# Patient Record
Sex: FEMALE | Race: WHITE | Employment: OTHER | URBAN - METROPOLITAN AREA
[De-identification: names, ages, dates, MRNs, and addresses within clinical notes are randomized per-mention and may not be internally consistent; named-entity substitution may affect disease eponyms.]

---

## 2017-03-29 ENCOUNTER — HOSPITAL ENCOUNTER (OUTPATIENT)
Dept: WOMENS IMAGING | Age: 57
Discharge: HOME | End: 2017-03-29
Attending: OBSTETRICS & GYNECOLOGY | Admitting: OBSTETRICS & GYNECOLOGY

## 2017-03-29 DIAGNOSIS — Z12.31 VISIT FOR SCREENING MAMMOGRAM: ICD-10-CM

## 2018-02-08 ENCOUNTER — HOSPITAL ENCOUNTER (OUTPATIENT)
Dept: MAMMOGRAPHY | Age: 58
Discharge: HOME | End: 2018-02-08
Attending: OBSTETRICS & GYNECOLOGY | Admitting: OBSTETRICS & GYNECOLOGY

## 2018-02-08 DIAGNOSIS — Z12.31 VISIT FOR SCREENING MAMMOGRAM: ICD-10-CM

## 2018-05-02 ENCOUNTER — HOSPITAL ENCOUNTER (OUTPATIENT)
Dept: WOMENS IMAGING | Age: 58
Discharge: HOME | End: 2018-05-02
Attending: OBSTETRICS & GYNECOLOGY | Admitting: OBSTETRICS & GYNECOLOGY

## 2018-05-02 DIAGNOSIS — Z12.31 VISIT FOR SCREENING MAMMOGRAM: ICD-10-CM

## 2018-05-07 ENCOUNTER — CASE MANAGEMENT (OUTPATIENT)
Dept: CASE MANAGEMENT | Age: 58
End: 2018-05-07

## 2018-05-07 ENCOUNTER — HOSPITAL ENCOUNTER (OUTPATIENT)
Dept: WOMENS IMAGING | Age: 58
Discharge: HOME | End: 2018-05-07
Admitting: OBSTETRICS & GYNECOLOGY

## 2018-05-07 DIAGNOSIS — R92.8 ABNORMAL MAMMOGRAM: ICD-10-CM

## 2018-05-07 DIAGNOSIS — R92.8 OTHER ABNORMAL AND INCONCLUSIVE FINDINGS ON DIAGNOSTIC IMAGING OF BREAST: ICD-10-CM

## 2019-02-13 ENCOUNTER — HOSPITAL ENCOUNTER (OUTPATIENT)
Dept: MAMMOGRAPHY | Age: 59
Discharge: HOME | End: 2019-02-18
Payer: COMMERCIAL

## 2019-02-13 DIAGNOSIS — Z12.31 VISIT FOR SCREENING MAMMOGRAM: ICD-10-CM

## 2019-02-13 PROCEDURE — 77067 SCR MAMMO BI INCL CAD: CPT

## 2020-10-01 ENCOUNTER — HOSPITAL ENCOUNTER (OUTPATIENT)
Dept: WOMENS IMAGING | Age: 60
Discharge: HOME | End: 2020-10-01

## 2020-10-01 PROCEDURE — 77067 SCR MAMMO BI INCL CAD: CPT

## 2021-03-08 ENCOUNTER — NURSE ONLY (OUTPATIENT)
Dept: PRIMARY CARE CLINIC | Age: 61
End: 2021-03-08
Payer: COMMERCIAL

## 2021-03-08 DIAGNOSIS — Z23 HIGH PRIORITY FOR COVID-19 VIRUS VACCINATION: Primary | ICD-10-CM

## 2021-03-08 PROCEDURE — 0011A COVID-19, MODERNA VACCINE 100MCG/0.5ML DOSE: CPT | Performed by: FAMILY MEDICINE

## 2021-03-08 PROCEDURE — 91301 COVID-19, MODERNA VACCINE 100MCG/0.5ML DOSE: CPT | Performed by: FAMILY MEDICINE

## 2021-03-12 ENCOUNTER — NURSE ONLY (OUTPATIENT)
Dept: PRIMARY CARE CLINIC | Age: 61
End: 2021-03-12
Payer: COMMERCIAL

## 2021-03-12 DIAGNOSIS — Z23 HIGH PRIORITY FOR COVID-19 VIRUS VACCINATION: Primary | ICD-10-CM

## 2021-04-05 PROCEDURE — 0012A COVID-19, MODERNA VACCINE 100MCG/0.5ML DOSE: CPT | Performed by: NURSE PRACTITIONER

## 2021-04-05 PROCEDURE — 91301 COVID-19, MODERNA VACCINE 100MCG/0.5ML DOSE: CPT | Performed by: NURSE PRACTITIONER

## 2022-01-31 ENCOUNTER — HOSPITAL ENCOUNTER (OUTPATIENT)
Dept: WOMENS IMAGING | Age: 62
Discharge: HOME | End: 2022-01-31
Payer: COMMERCIAL

## 2022-01-31 DIAGNOSIS — Z12.31 VISIT FOR SCREENING MAMMOGRAM: ICD-10-CM

## 2022-01-31 PROCEDURE — 77067 SCR MAMMO BI INCL CAD: CPT

## 2022-02-02 ENCOUNTER — CASE MANAGEMENT (OUTPATIENT)
Dept: WOMENS IMAGING | Age: 62
End: 2022-02-02

## 2022-02-02 NOTE — PROGRESS NOTES
2/2/22 9:56  RN left voice mail message concerning abnormal screening mammogram.  2/2/22 11:36 Pt had scheduled her appt. for diagnostic breast imaging with someone else outside of this department. Pt is scheduled to go to [facility].  2/2/22 11:43 Pt returned my call and left a VM stating she is appalled at the wait time to get in for follow up testing.

## 2022-02-07 ENCOUNTER — HOSPITAL ENCOUNTER (OUTPATIENT)
Dept: WOMENS IMAGING | Age: 62
Discharge: HOME | End: 2022-02-07
Payer: COMMERCIAL

## 2022-02-07 ENCOUNTER — HOSPITAL ENCOUNTER (OUTPATIENT)
Dept: ULTRASOUND IMAGING | Age: 62
Discharge: HOME | End: 2022-02-07
Payer: COMMERCIAL

## 2022-02-07 DIAGNOSIS — R92.8 ABNORMAL MAMMOGRAM: ICD-10-CM

## 2022-02-07 PROCEDURE — 77065 DX MAMMO INCL CAD UNI: CPT

## 2022-02-07 PROCEDURE — 76642 ULTRASOUND BREAST LIMITED: CPT

## 2023-02-21 ENCOUNTER — HOSPITAL ENCOUNTER (OUTPATIENT)
Dept: WOMENS IMAGING | Age: 63
Discharge: HOME | End: 2023-02-21
Payer: COMMERCIAL

## 2023-02-21 DIAGNOSIS — Z12.31 VISIT FOR SCREENING MAMMOGRAM: ICD-10-CM

## 2023-02-21 PROCEDURE — 77067 SCR MAMMO BI INCL CAD: CPT

## 2023-03-03 ENCOUNTER — HOSPITAL ENCOUNTER (OUTPATIENT)
Dept: WOMENS IMAGING | Age: 63
Discharge: HOME | End: 2023-03-03
Payer: COMMERCIAL

## 2023-03-03 DIAGNOSIS — R92.8 ABNORMAL MAMMOGRAM: ICD-10-CM

## 2023-03-03 PROCEDURE — G0279 TOMOSYNTHESIS, MAMMO: HCPCS

## 2023-03-03 NOTE — DISCHARGE INSTRUCTIONS
[address]            [address] [facility] [phone]      Stereotactic Biopsy Education     NAME:  Otf Huddleston  YOB: 1960  MEDICAL RECORD NUMBER:  [id number]  TODAY'S DATE:  @ED@    What is it? Stereotactic breast biopsy is a test that uses imaging, such as  X-ray, to find an area of your breast where a tissue sample will be taken. The sample is viewed  under a microscope to check for signs of breast cancer. Why is this test done? This type of breast biopsy is usually done to check for cancer in a lump or microcalcifications found during a mammogram.     How can you prepare for the test?    You may take your regular medications as prescribed. .   You may eat and drink before the test.  Take a shower the evening or morning before the biopsy. What happens before the test?   Mammogram images are taken to find the exact site for the biopsy. Your skin is washed with an alcohol prep. You will be given an injection of medication to numb your breast.     What happens during the test?  When your breast is numb, a small cut is made in the skin. Using the imaging, the doctor will guide the needle into the biopsy area. A sample of breast tissue is taken through the needle. A small clip is inserted into your breast to ira the biopsy site. The needle is removed and pressure put on the needle site to stop any bleeding. Steri Strips and a bandage will be placed over the site. How long does the test take? About 60 minutes. Most of the time is spent preparing for the images and finding the area for the biopsy. What are the risks? Bleeding: You may have some bleeding which can cause bruising, swelling, or hematoma. Infection: Signs of infection are redness, swelling, heat, or increasing pain at site. Sample is not adequate: This would require repeating the biopsy.      What happens after the test?  The nurse will review your post biopsy instructions which include:        Placing an ice pack in your bra. Wearing a firm fitting bra. You may use Tylenol (Acetaminiphen) for discomfort. Lab results take about 2-3 business days. The Nurse Navigator or your doctor will call you with the results. Pre Stereotactic Breast Biopsy:     (Please arrive 20 minutes early)                                                 [x] Blood thinner history reviewed with patient    [x] Take all your other medications on your normal routine schedule. [x] You may eat and drink as normal before your biopsy. [x] You may drive yourself. [x] Take a shower the night before or the morning of the biopsy. [x] Wear a bra in order to hold ice pack in place after the biopsy. [x] No heavy lifting or exercise for 48 hours after the biopsy. [x] Printed Pre Stereotactic Biopsy Instructions were provided, reviewed with the patient and all questions were answered. [x] A list of [facility] has been provided to the patient. [facility] Information: [address] [place] [address] 8:00 am - 4:30 pm.   Should you experience any significant changes in your health or have questions about your care, please contact the Counts include 234 beds at the [facility] at [phone]      If you need help with your care outside these hours and cannot wait until the next business day,  contact your Physician or go to the hospital emergency room. [x] Patient/POA/Caregiver verbalized understanding of Discharge Instructions.        Electronically signed by Segundo Randhawa RN on 3/3/2023 at 11:25 AM

## 2023-03-13 ENCOUNTER — HOSPITAL ENCOUNTER (OUTPATIENT)
Dept: WOMENS IMAGING | Age: 63
Discharge: HOME | End: 2023-03-13
Payer: COMMERCIAL

## 2023-03-13 VITALS — DIASTOLIC BLOOD PRESSURE: 86 MMHG | SYSTOLIC BLOOD PRESSURE: 137 MMHG | HEART RATE: 80 BPM | OXYGEN SATURATION: 97 %

## 2023-03-13 DIAGNOSIS — R92.8 ABNORMAL MAMMOGRAM: ICD-10-CM

## 2023-03-13 PROCEDURE — A4648 IMPLANTABLE TISSUE MARKER: HCPCS

## 2023-03-13 PROCEDURE — 88305 TISSUE EXAM BY PATHOLOGIST: CPT

## 2023-03-13 PROCEDURE — 76098 X-RAY EXAM SURGICAL SPECIMEN: CPT

## 2023-03-13 PROCEDURE — 77065 DX MAMMO INCL CAD UNI: CPT

## 2023-03-13 ASSESSMENT — PAIN SCALES - GENERAL
PAINLEVEL_OUTOF10: 0
PAINLEVEL_OUTOF10: 0

## 2023-03-13 NOTE — DISCHARGE INSTRUCTIONS
[facility]   [place], [address]  Telephone: [phone]    NAME:  Mele Maher OF BIRTH:  1960  GENDER: female  MEDICAL RECORD NUMBER:  [id number]  TODAY'S DATE:  @ED@    Discharge Instructions [facility]:    Post Breast Biopsy Instructions    [x] Place the provided cold pack inside your bra on top of the dressing for at least 3 hours. You may re-freeze it and use it again later if you have any discomfort. [x] You may take Tylenol (Acetaminophen) the day of your biopsy for any discomfort. [x] Watch for excessive bleeding. If bleeding occurs apply pressure to site. Watch for signs of infection, increased pain, redness, swelling and heat. If this occurs call your physician. [x] Do not participate in any strenuous exercise for 48 hours after your biopsy, such as tennis, aerobics, weight lifting and household activities that involve use of your affected   arm. [x] You may remove your outer dressing in 24 hours and you may shower. \"Steri-strips\" tape will stay on for a few days longer then can be removed. [facility] Information:   Should you experience any significant changes in your health or have questions about your care, please contact:  [employer] [address]   Monday-Friday 8:00 am - 4:30 pm.  If you need help with your care outside these hours and cannot wait until we are again available,  contact your Physician or go to the hospital emergency.        Electronically signed by Doroteo Malhotra RN on 3/13/2023 at 9:44 AM

## 2023-03-13 NOTE — PROGRESS NOTES
Breast Biopsy Nursing Note    NAME:  Gatito Guaman  YOB: 1960 GENDER: female  MEDICAL RECORD NUMBER:  [id number]  DATE:  3/13/2023    Breast Biopsy completed by . Expiration date site marker checked immediately prior to use. Package intact prior to use and no damage noted. Site marker was removed from protective sterile packaging by physician. Site marker was placed by physician into right     breast/s. Hemostasis achieved via site pressure; Biopsy site cleansed with alcohol  Steri-strips applied along with small amount of bacitracin-neomycin polymixin then 2X2 tegaderm   Breast Biopsy tissue was placed in proper container/s and labeled. Breast Biopsy tissue was taken to Pathology for evaluation. Procedural Pain:  0  / 10     Post Procedural Pain:  0 / 10     Procedure well tolerated. Pt stable and alert and oriented x 3 upon discharge. Ice pack placed over biopsy site. Pt given post-biopsy education and all questions answered. Pt has NN contact info.        Electronically signed by Nicole Vanegas RN on 3/13/2023 at 9:45 AM

## 2023-03-14 ENCOUNTER — CASE MANAGEMENT (OUTPATIENT)
Dept: WOMENS IMAGING | Age: 63
End: 2023-03-14

## 2023-03-14 NOTE — PROGRESS NOTES
RN spoke with pt concerning her benign breast pathology results and the recommendation to follow up in 12 months. Pt verbalized understanding.

## 2023-08-25 SDOH — HEALTH STABILITY: PHYSICAL HEALTH: ON AVERAGE, HOW MANY DAYS PER WEEK DO YOU ENGAGE IN MODERATE TO STRENUOUS EXERCISE (LIKE A BRISK WALK)?: 6 DAYS

## 2023-08-25 SDOH — HEALTH STABILITY: PHYSICAL HEALTH: ON AVERAGE, HOW MANY MINUTES DO YOU ENGAGE IN EXERCISE AT THIS LEVEL?: 50 MIN

## 2023-08-28 ENCOUNTER — OFFICE VISIT (OUTPATIENT)
Dept: INTERNAL MEDICINE CLINIC | Age: 63
End: 2023-08-28

## 2023-08-28 VITALS
DIASTOLIC BLOOD PRESSURE: 88 MMHG | WEIGHT: 179.8 LBS | SYSTOLIC BLOOD PRESSURE: 135 MMHG | OXYGEN SATURATION: 97 % | HEART RATE: 85 BPM | TEMPERATURE: 98.5 F

## 2023-08-28 DIAGNOSIS — Z00.00 PREVENTATIVE HEALTH CARE: Primary | ICD-10-CM

## 2023-08-28 SDOH — ECONOMIC STABILITY: FOOD INSECURITY: WITHIN THE PAST 12 MONTHS, THE FOOD YOU BOUGHT JUST DIDN'T LAST AND YOU DIDN'T HAVE MONEY TO GET MORE.: NEVER TRUE

## 2023-08-28 SDOH — ECONOMIC STABILITY: HOUSING INSECURITY
IN THE LAST 12 MONTHS, WAS THERE A TIME WHEN YOU DID NOT HAVE A STEADY PLACE TO SLEEP OR SLEPT IN A SHELTER (INCLUDING NOW)?: NO

## 2023-08-28 SDOH — ECONOMIC STABILITY: FOOD INSECURITY: WITHIN THE PAST 12 MONTHS, YOU WORRIED THAT YOUR FOOD WOULD RUN OUT BEFORE YOU GOT MONEY TO BUY MORE.: NEVER TRUE

## 2023-08-28 SDOH — ECONOMIC STABILITY: INCOME INSECURITY: HOW HARD IS IT FOR YOU TO PAY FOR THE VERY BASICS LIKE FOOD, HOUSING, MEDICAL CARE, AND HEATING?: NOT HARD AT ALL

## 2023-08-28 ASSESSMENT — ENCOUNTER SYMPTOMS
BACK PAIN: 0
SORE THROAT: 0
CONSTIPATION: 0
COUGH: 0
CHEST TIGHTNESS: 0
NAUSEA: 0
DIARRHEA: 0
VOMITING: 0
ABDOMINAL PAIN: 0

## 2023-08-28 ASSESSMENT — PATIENT HEALTH QUESTIONNAIRE - PHQ9
SUM OF ALL RESPONSES TO PHQ QUESTIONS 1-9: 0
SUM OF ALL RESPONSES TO PHQ9 QUESTIONS 1 & 2: 0
2. FEELING DOWN, DEPRESSED OR HOPELESS: 0
SUM OF ALL RESPONSES TO PHQ QUESTIONS 1-9: 0
SUM OF ALL RESPONSES TO PHQ QUESTIONS 1-9: 0
1. LITTLE INTEREST OR PLEASURE IN DOING THINGS: 0
SUM OF ALL RESPONSES TO PHQ QUESTIONS 1-9: 0

## 2023-08-28 NOTE — PROGRESS NOTES
[facility]) Internal Medicine    Cooper Cruz is a 58 y.o. female with the past medical history as listed below presenting to the clinic for new patient visit. She feels well. Preventative health care: Patient did had colonoscopy 5 years ago. Review of Systems   Constitutional:  Negative for chills, fatigue and fever. HENT:  Negative for congestion, ear pain and sore throat. Respiratory:  Negative for cough and chest tightness. Cardiovascular:  Negative for chest pain, palpitations and leg swelling. Gastrointestinal:  Negative for abdominal pain, constipation, diarrhea, nausea and vomiting. Genitourinary:  Negative for dysuria, flank pain and urgency. Musculoskeletal:  Negative for arthralgias, back pain and joint swelling. Skin:  Negative for rash. Neurological:  Negative for dizziness, weakness and numbness. Psychiatric/Behavioral:  Negative for sleep disturbance. The patient is not nervous/anxious. No past medical history on file. Past Surgical History:   Procedure Laterality Date    BREAST BIOPSY      LORIE STEROTACTIC LOC BREAST BIOPSY RIGHT Right 3/13/2023    [facility]ACT[facility]       Social History     Socioeconomic History    Marital status:      Spouse name: Not on file    Number of children: Not on file    Years of education: Not on file    Highest education level: Not on file   Occupational History    Not on file   Tobacco Use    Smoking status: Never    Smokeless tobacco: Never   Substance and Sexual Activity    Alcohol use:  Yes     Alcohol/week: 5.0 standard drinks     Types: 5 Glasses of wine per week    Drug use: Not on file    Sexual activity: Yes     Partners: Male   Other Topics Concern    Not on file   Social History Narrative    Not on file     Social Determinants of Health     Financial Resource Strain: Low Risk     Difficulty of Paying Living Expenses: Not hard at all   Food Insecurity: No Food

## 2023-08-29 DIAGNOSIS — Z00.00 PREVENTATIVE HEALTH CARE: ICD-10-CM

## 2023-08-29 LAB
ALBUMIN SERPL-MCNC: 4.8 G/DL (ref 3.4–5)
ALBUMIN/GLOB SERPL: 1.7 {RATIO} (ref 1.1–2.2)
ALP SERPL-CCNC: 114 U/L (ref 40–129)
ALT SERPL-CCNC: 86 U/L (ref 10–40)
ANION GAP SERPL CALCULATED.3IONS-SCNC: 12 MMOL/L (ref 3–16)
AST SERPL-CCNC: 68 U/L (ref 15–37)
BASOPHILS # BLD: 0.1 K/UL (ref 0–0.2)
BASOPHILS NFR BLD: 1.4 %
BILIRUB SERPL-MCNC: 1.1 MG/DL (ref 0–1)
BUN SERPL-MCNC: 14 MG/DL (ref 7–20)
CALCIUM SERPL-MCNC: 9.8 MG/DL (ref 8.3–10.6)
CHLORIDE SERPL-SCNC: 104 MMOL/L (ref 99–110)
CHOLEST SERPL-MCNC: 192 MG/DL (ref 0–199)
CO2 SERPL-SCNC: 25 MMOL/L (ref 21–32)
CREAT SERPL-MCNC: 0.7 MG/DL (ref 0.6–1.2)
DEPRECATED RDW RBC AUTO: 12.7 % (ref 12.4–15.4)
EOSINOPHIL # BLD: 0.1 K/UL (ref 0–0.6)
EOSINOPHIL NFR BLD: 2.8 %
GFR SERPLBLD CREATININE-BSD FMLA CKD-EPI: >60 ML/MIN/{1.73_M2}
GLUCOSE SERPL-MCNC: 139 MG/DL (ref 70–99)
HCT VFR BLD AUTO: 43.6 % (ref 36–48)
HDLC SERPL-MCNC: 65 MG/DL (ref 40–60)
HGB BLD-MCNC: 15.3 G/DL (ref 12–16)
LDLC SERPL CALC-MCNC: 106 MG/DL
LYMPHOCYTES # BLD: 1.6 K/UL (ref 1–5.1)
LYMPHOCYTES NFR BLD: 42.1 %
MCH RBC QN AUTO: 32.9 PG (ref 26–34)
MCHC RBC AUTO-ENTMCNC: 35 G/DL (ref 31–36)
MCV RBC AUTO: 93.8 FL (ref 80–100)
MONOCYTES # BLD: 0.3 K/UL (ref 0–1.3)
MONOCYTES NFR BLD: 8.1 %
NEUTROPHILS # BLD: 1.8 K/UL (ref 1.7–7.7)
NEUTROPHILS NFR BLD: 45.6 %
PLATELET # BLD AUTO: 208 K/UL (ref 135–450)
PMV BLD AUTO: 8.6 FL (ref 5–10.5)
POTASSIUM SERPL-SCNC: 4.2 MMOL/L (ref 3.5–5.1)
PROT SERPL-MCNC: 7.7 G/DL (ref 6.4–8.2)
RBC # BLD AUTO: 4.65 M/UL (ref 4–5.2)
SODIUM SERPL-SCNC: 141 MMOL/L (ref 136–145)
TRIGL SERPL-MCNC: 107 MG/DL (ref 0–150)
VLDLC SERPL CALC-MCNC: 21 MG/DL
WBC # BLD AUTO: 3.9 K/UL (ref 4–11)

## 2024-07-01 ENCOUNTER — HOSPITAL ENCOUNTER (OUTPATIENT)
Dept: WOMENS IMAGING | Age: 64
Discharge: HOME | End: 2024-07-01
Payer: COMMERCIAL

## 2024-07-01 DIAGNOSIS — Z12.31 BREAST CANCER SCREENING BY MAMMOGRAM: ICD-10-CM

## 2024-07-01 PROCEDURE — 77063 BREAST TOMOSYNTHESIS BI: CPT

## 2024-07-18 ENCOUNTER — COMMUNITY OUTREACH (OUTPATIENT)
Dept: INTERNAL MEDICINE CLINIC | Age: 64
End: 2024-07-18

## 2024-07-18 NOTE — PROGRESS NOTES
Patient's HM shows they are overdue for Colorectal Screening.   Care Everywhere and  files searched.   updated with 11/25/14 Colonoscopy.

## 2024-10-23 NOTE — PROGRESS NOTES
[facility] PRE-OPERATIVE INSTRUCTIONS    Day of Procedure:  11/12/24              Arrival time:    0600            Surgery time: 0730    Take the following medications with a sip of water:  Follow your MD/Surgeons pre-procedure instructions regarding your medications     Do not eat or drink anything after 12:00 midnight prior to your surgery.  This includes water chewing gum, mints and ice chips.   You may brush your teeth and gargle the morning of your surgery, but do not swallow the water     Please see your family doctor/pediatrician for a history and physical and/or concerning medications.   Bring any test results/reports from your physicians office.   If you are under the care of a heart doctor or specialist doctor, please be aware that you may be asked to them for clearance    You may be asked to stop blood thinners such as Coumadin, Plavix, Fragmin, Lovenox, etc., or any anti-inflammatories such as:  Aspirin, Ibuprofen, Advil, Naproxen prior to your surgery.    We also ask that you stop any OTC medications such as fish oil, vitamin E, glucosamine, garlic, Multivitamins, COQ 10, etc. MAY TAKE TYLENOL    We ask that you do not smoke 24 hours prior to surgery  We ask that you do not  drink any alcoholic beverages 24 hours prior to surgery     You must make arrangements for a responsible adult to take you home after your surgery.    For your safety you will not be allowed to leave alone or drive yourself home.  Your surgery will be cancelled if you do not have a ride home.     Also for your safety, you must have someone stay with you the first 24 hours after your surgery.     A parent or legal guardian must accompany a child scheduled for surgery and plan to stay at the hospital until the child is discharged.    Please do not bring other children with you.    For your comfort, please wear simple loose fitting clothing to the hospital.  Please do not bring valuables.    Do not wear any make-up or nail

## 2024-10-23 NOTE — PROGRESS NOTES
Follow Up Prior to Surgery    DOS: 24  :1960      History and Physical: APPOINTMENT WITH DR. PITTMAN ON 24 @ 0920 [phone]    Missing Orders: PRE-OP ORDERS TO BE FAXED AFTER 24 APPOINTMENT WITH DR. PITTMAN

## 2024-11-11 ENCOUNTER — ANESTHESIA EVENT (OUTPATIENT)
Dept: OPERATING ROOM | Age: 64
End: 2024-11-11
Payer: COMMERCIAL

## 2024-11-12 ENCOUNTER — HOSPITAL ENCOUNTER (OUTPATIENT)
Age: 64
Setting detail: OUTPATIENT SURGERY
Discharge: HOME | End: 2024-11-12
Attending: OBSTETRICS & GYNECOLOGY | Admitting: OBSTETRICS & GYNECOLOGY
Payer: COMMERCIAL

## 2024-11-12 ENCOUNTER — ANESTHESIA (OUTPATIENT)
Dept: OPERATING ROOM | Age: 64
End: 2024-11-12
Payer: COMMERCIAL

## 2024-11-12 VITALS
RESPIRATION RATE: 18 BRPM | OXYGEN SATURATION: 98 % | TEMPERATURE: 97.2 F | DIASTOLIC BLOOD PRESSURE: 74 MMHG | BODY MASS INDEX: 28.88 KG/M2 | HEART RATE: 62 BPM | SYSTOLIC BLOOD PRESSURE: 143 MMHG | WEIGHT: 183.97 LBS | HEIGHT: 67 IN

## 2024-11-12 DIAGNOSIS — N95.0 POSTMENOPAUSAL BLEEDING: ICD-10-CM

## 2024-11-12 PROCEDURE — A4217 STERILE WATER/SALINE, 500 ML: HCPCS | Performed by: OBSTETRICS & GYNECOLOGY

## 2024-11-12 PROCEDURE — 7100000001 HC PACU RECOVERY - ADDTL 15 MIN: Performed by: OBSTETRICS & GYNECOLOGY

## 2024-11-12 PROCEDURE — 88305 TISSUE EXAM BY PATHOLOGIST: CPT

## 2024-11-12 PROCEDURE — 2720000010 HC SURG SUPPLY STERILE: Performed by: OBSTETRICS & GYNECOLOGY

## 2024-11-12 PROCEDURE — 6360000002 HC RX W HCPCS

## 2024-11-12 PROCEDURE — 2709999900 HC NON-CHARGEABLE SUPPLY: Performed by: OBSTETRICS & GYNECOLOGY

## 2024-11-12 PROCEDURE — 7100000000 HC PACU RECOVERY - FIRST 15 MIN: Performed by: OBSTETRICS & GYNECOLOGY

## 2024-11-12 PROCEDURE — 3600000014 HC SURGERY LEVEL 4 ADDTL 15MIN: Performed by: OBSTETRICS & GYNECOLOGY

## 2024-11-12 PROCEDURE — 3600000004 HC SURGERY LEVEL 4 BASE: Performed by: OBSTETRICS & GYNECOLOGY

## 2024-11-12 PROCEDURE — 7100000010 HC PHASE II RECOVERY - FIRST 15 MIN: Performed by: OBSTETRICS & GYNECOLOGY

## 2024-11-12 PROCEDURE — 2580000003 HC RX 258: Performed by: ANESTHESIOLOGY

## 2024-11-12 PROCEDURE — 2500000003 HC RX 250 WO HCPCS

## 2024-11-12 PROCEDURE — 7100000011 HC PHASE II RECOVERY - ADDTL 15 MIN: Performed by: OBSTETRICS & GYNECOLOGY

## 2024-11-12 PROCEDURE — 3700000000 HC ANESTHESIA ATTENDED CARE: Performed by: OBSTETRICS & GYNECOLOGY

## 2024-11-12 PROCEDURE — 2580000003 HC RX 258: Performed by: OBSTETRICS & GYNECOLOGY

## 2024-11-12 PROCEDURE — 3700000001 HC ADD 15 MINUTES (ANESTHESIA): Performed by: OBSTETRICS & GYNECOLOGY

## 2024-11-12 RX ORDER — SODIUM CHLORIDE 0.9 % (FLUSH) 0.9 %
5-40 SYRINGE (ML) INJECTION EVERY 12 HOURS SCHEDULED
Status: DISCONTINUED | OUTPATIENT
Start: 2024-11-12 | End: 2024-11-12 | Stop reason: HOSPADM

## 2024-11-12 RX ORDER — SODIUM CHLORIDE 9 MG/ML
INJECTION, SOLUTION INTRAVENOUS PRN
Status: DISCONTINUED | OUTPATIENT
Start: 2024-11-12 | End: 2024-11-12 | Stop reason: HOSPADM

## 2024-11-12 RX ORDER — LIDOCAINE HYDROCHLORIDE 20 MG/ML
INJECTION, SOLUTION EPIDURAL; INFILTRATION; INTRACAUDAL; PERINEURAL
Status: DISCONTINUED | OUTPATIENT
Start: 2024-11-12 | End: 2024-11-12 | Stop reason: SDUPTHER

## 2024-11-12 RX ORDER — ONDANSETRON 2 MG/ML
4 INJECTION INTRAMUSCULAR; INTRAVENOUS
Status: DISCONTINUED | OUTPATIENT
Start: 2024-11-12 | End: 2024-11-12 | Stop reason: HOSPADM

## 2024-11-12 RX ORDER — OXYCODONE HYDROCHLORIDE 10 MG/1
10 TABLET ORAL PRN
Status: DISCONTINUED | OUTPATIENT
Start: 2024-11-12 | End: 2024-11-12 | Stop reason: HOSPADM

## 2024-11-12 RX ORDER — ONDANSETRON 2 MG/ML
INJECTION INTRAMUSCULAR; INTRAVENOUS
Status: DISCONTINUED | OUTPATIENT
Start: 2024-11-12 | End: 2024-11-12 | Stop reason: SDUPTHER

## 2024-11-12 RX ORDER — SODIUM CHLORIDE 0.9 % (FLUSH) 0.9 %
5-40 SYRINGE (ML) INJECTION PRN
Status: DISCONTINUED | OUTPATIENT
Start: 2024-11-12 | End: 2024-11-12 | Stop reason: HOSPADM

## 2024-11-12 RX ORDER — PROPOFOL 10 MG/ML
INJECTION, EMULSION INTRAVENOUS
Status: DISCONTINUED | OUTPATIENT
Start: 2024-11-12 | End: 2024-11-12 | Stop reason: SDUPTHER

## 2024-11-12 RX ORDER — OXYCODONE HYDROCHLORIDE 5 MG/1
5 TABLET ORAL PRN
Status: DISCONTINUED | OUTPATIENT
Start: 2024-11-12 | End: 2024-11-12 | Stop reason: HOSPADM

## 2024-11-12 RX ORDER — NALOXONE HYDROCHLORIDE 0.4 MG/ML
INJECTION, SOLUTION INTRAMUSCULAR; INTRAVENOUS; SUBCUTANEOUS PRN
Status: DISCONTINUED | OUTPATIENT
Start: 2024-11-12 | End: 2024-11-12 | Stop reason: HOSPADM

## 2024-11-12 RX ORDER — DEXAMETHASONE SODIUM PHOSPHATE 4 MG/ML
INJECTION, SOLUTION INTRA-ARTICULAR; INTRALESIONAL; INTRAMUSCULAR; INTRAVENOUS; SOFT TISSUE
Status: DISCONTINUED | OUTPATIENT
Start: 2024-11-12 | End: 2024-11-12 | Stop reason: SDUPTHER

## 2024-11-12 RX ORDER — KETOROLAC TROMETHAMINE 30 MG/ML
INJECTION, SOLUTION INTRAMUSCULAR; INTRAVENOUS
Status: DISCONTINUED | OUTPATIENT
Start: 2024-11-12 | End: 2024-11-12 | Stop reason: SDUPTHER

## 2024-11-12 RX ORDER — MAGNESIUM HYDROXIDE 1200 MG/15ML
LIQUID ORAL CONTINUOUS PRN
Status: COMPLETED | OUTPATIENT
Start: 2024-11-12 | End: 2024-11-12

## 2024-11-12 RX ORDER — FENTANYL CITRATE 50 UG/ML
INJECTION, SOLUTION INTRAMUSCULAR; INTRAVENOUS
Status: DISCONTINUED | OUTPATIENT
Start: 2024-11-12 | End: 2024-11-12 | Stop reason: SDUPTHER

## 2024-11-12 RX ORDER — MEPERIDINE HYDROCHLORIDE 25 MG/ML
12.5 INJECTION INTRAMUSCULAR; INTRAVENOUS; SUBCUTANEOUS EVERY 5 MIN PRN
Status: DISCONTINUED | OUTPATIENT
Start: 2024-11-12 | End: 2024-11-12 | Stop reason: HOSPADM

## 2024-11-12 RX ORDER — MIDAZOLAM HYDROCHLORIDE 1 MG/ML
INJECTION, SOLUTION INTRAMUSCULAR; INTRAVENOUS
Status: DISCONTINUED | OUTPATIENT
Start: 2024-11-12 | End: 2024-11-12 | Stop reason: SDUPTHER

## 2024-11-12 RX ORDER — FENTANYL CITRATE 0.05 MG/ML
50 INJECTION, SOLUTION INTRAMUSCULAR; INTRAVENOUS EVERY 5 MIN PRN
Status: DISCONTINUED | OUTPATIENT
Start: 2024-11-12 | End: 2024-11-12 | Stop reason: HOSPADM

## 2024-11-12 RX ORDER — FENTANYL CITRATE 0.05 MG/ML
25 INJECTION, SOLUTION INTRAMUSCULAR; INTRAVENOUS EVERY 5 MIN PRN
Status: DISCONTINUED | OUTPATIENT
Start: 2024-11-12 | End: 2024-11-12 | Stop reason: HOSPADM

## 2024-11-12 RX ADMIN — FENTANYL CITRATE 25 MCG: 50 INJECTION INTRAMUSCULAR; INTRAVENOUS at 07:51

## 2024-11-12 RX ADMIN — DEXAMETHASONE SODIUM PHOSPHATE 10 MG: 4 INJECTION, SOLUTION INTRAMUSCULAR; INTRAVENOUS at 07:34

## 2024-11-12 RX ADMIN — SODIUM CHLORIDE 50 ML/HR: 9 INJECTION, SOLUTION INTRAVENOUS at 07:00

## 2024-11-12 RX ADMIN — FENTANYL CITRATE 25 MCG: 50 INJECTION INTRAMUSCULAR; INTRAVENOUS at 08:02

## 2024-11-12 RX ADMIN — ONDANSETRON 4 MG: 2 INJECTION INTRAMUSCULAR; INTRAVENOUS at 07:34

## 2024-11-12 RX ADMIN — LIDOCAINE HYDROCHLORIDE 100 MG: 20 INJECTION, SOLUTION EPIDURAL; INFILTRATION; INTRACAUDAL; PERINEURAL at 07:32

## 2024-11-12 RX ADMIN — PROPOFOL 150 MG: 10 INJECTION, EMULSION INTRAVENOUS at 07:32

## 2024-11-12 RX ADMIN — KETOROLAC TROMETHAMINE 30 MG: 30 INJECTION, SOLUTION INTRAMUSCULAR at 08:06

## 2024-11-12 RX ADMIN — MIDAZOLAM 2 MG: 1 INJECTION INTRAMUSCULAR; INTRAVENOUS at 07:27

## 2024-11-12 ASSESSMENT — PAIN - FUNCTIONAL ASSESSMENT
PAIN_FUNCTIONAL_ASSESSMENT: FACE, LEGS, ACTIVITY, CRY, AND CONSOLABILITY (FLACC)
PAIN_FUNCTIONAL_ASSESSMENT: NONE - DENIES PAIN
PAIN_FUNCTIONAL_ASSESSMENT: NONE - DENIES PAIN
PAIN_FUNCTIONAL_ASSESSMENT: 0-10
PAIN_FUNCTIONAL_ASSESSMENT: 0-10

## 2024-11-12 NOTE — OP NOTE
boots applied to the lower extremities.  General anesthesia was administered without difficulty and was adequate.  The patient was then placed in the dorsal lithotomy position with her legs in candy-cane stirrups.     An examination under anesthesia revealed a mobile anteverted  uterus with regular surface. The patient was then prepped and draped in a regular sterile fashion. Time-out was performed.      Two Johnson retractors were utilized in the patient's vagina, cervix was visualized and  grabbed with a single-tooth tenaculum at the anterior lip. The cervical canal was  sequentially dilated to accommodate the Myosure hysteroscope.  The uterus was then carefully sounded to 8 cm.  A hysteroscope was gently advanced into the uterine cavity under direct visualization and uterus was distended with normal saline.  The above-mentioned findings were noted.   The polyp was removed with the Myosure XL. Next, hysteroscope was withdrawn and a medium-size curette was used to gently curettage the uterine cavity in a clockwise fashion in all aspects of the uterus Curettings were sent to Pathology for permanent section.  Following this, the procedure was abandoned.  The tenaculum was removed from the cervix and good hemostasis was noted at puncture site.     The patient tolerated the procedure well.  The instrument and sponge counts were correct x 2.  The patient was awakened from general anesthesia and taken to the recovery room in satisfactory condition.  She will follow up with me in the office in one- two week for the discussion of the results of pathology and future management plan.        Electronically signed by Mirna Patino MD on 11/12/2024 at 8:19 AM

## 2024-11-12 NOTE — PROGRESS NOTES
Vaginal Sweep Documentation     Vaginal prep sponge count performed by RK and BW Count correct.   Vaginal sweep performed by Sukumar at 0803. No foreign objects or vaginal tears noted.

## 2024-11-12 NOTE — PROGRESS NOTES
Discharge instructions given to patient and .  Verbalize understanding.  States she needs more time.

## 2024-11-12 NOTE — PROGRESS NOTES
Received from PACU. Admitted to Phase 2 care.  Awake and alert, respirations easy and even.  Oriented to room and surroundings.  No complaints.  Tearful over very recent death of mother.

## 2024-11-12 NOTE — ANESTHESIA PRE PROCEDURE
Department of Anesthesiology  Preprocedure Note       Name:  Alexandra Guaman   Age:  64 y.o.  :  1960                                          MRN:  [id number]         Date:  2024      Surgeon: Surgeon(s):  Mirna Patino MD    Procedure: Procedure(s):  HYSTEROSCOPY MYOSURE POLYP REMOVAL    Medications prior to admission:   Prior to Admission medications    Not on File       Current medications:    Current Facility-Administered Medications   Medication Dose Route Frequency Provider Last Rate Last Admin    sodium chloride flush 0.9 % injection 5-40 mL  5-40 mL IntraVENous 2 times per day Tamir Giraldo MD        sodium chloride flush 0.9 % injection 5-40 mL  5-40 mL IntraVENous PRN Tamir Giraldo MD        0.9 % sodium chloride infusion   IntraVENous PRN Tamir Giraldo MD 50 mL/hr at 24 0709 NoRateChange at 24 0709       Allergies:  No Known Allergies    Problem List:  There is no problem list on file for this patient.      Past Medical History:  History reviewed. No pertinent past medical history.    Past Surgical History:        Procedure Laterality Date    COLONOSCOPY      LORIE STEROTACTIC LOC BREAST BIOPSY RIGHT Right 2023    LORIE STEROTACTIC LOC BREAST BIOPSY RIGHT [facility]/13/2023 [facility] [place]       Social History:    Social History     Tobacco Use    Smoking status: Never    Smokeless tobacco: Never   Substance Use Topics    Alcohol use: Yes     Alcohol/week: 5.0 standard drinks of alcohol     Types: 5 Glasses of wine per week     Comment: soacilaly                                Counseling given: Not Answered      Vital Signs (Current):   Vitals:    10/23/24 1120 24 0620 24 0648   BP:   (!) 141/89   Pulse:   68   Resp:   16   Temp:   97.7 °F (36.5 °C)   TempSrc:   Oral   SpO2:   94%   Weight: 81.6 kg (180 lb) 83.4 kg (183 lb 15.6 oz)    Height: 1.702 m (5' 7\") 1.702 m (5' 7\")                                               BP Readings from Last 3 Encounters:   24

## 2024-11-12 NOTE — ANESTHESIA POSTPROCEDURE EVALUATION
Department of Anesthesiology  Postprocedure Note    Patient: Alexandra Guaman  MRN: [id number]  YOB: 1960  Date of evaluation: 11/12/2024    Procedure Summary       Date: 11/12/24 Room / Location: [address]    Anesthesia Start: 0727 Anesthesia Stop: 0820    Procedure: HYSTEROSCOPY MYOSURE POLYP REMOVAL Diagnosis:       Postmenopausal bleeding      (Postmenopausal bleeding [N95.0])    Surgeons: Mirna Patino MD Responsible Provider: Doron Arango MD    Anesthesia Type: General ASA Status: 2            Anesthesia Type: General    Africa Phase I: Africa Score: 9    Africa Phase II: Africa Score: 10    Anesthesia Post Evaluation    Patient location during evaluation: PACU  Patient participation: complete - patient participated  Level of consciousness: awake and alert  Pain score: 0  Airway patency: patent  Nausea & Vomiting: no nausea and no vomiting  Cardiovascular status: blood pressure returned to baseline  Respiratory status: acceptable  Hydration status: euvolemic  Pain management: adequate    No notable events documented.

## 2024-11-12 NOTE — PROGRESS NOTES
Alert and oriented. Agreeable to procedure. Consent signed by pt.  Electronically signed by Radha Johnson RN on 11/12/2024 at 6:54 AM.

## 2024-11-12 NOTE — DISCHARGE INSTRUCTIONS
[facility]  [address]  [place], [place] [address] [phone]        Dr. Mirna Patino M.D.          Dr. Indira Rivera M.D.   Phone [phone]  Fax [phone] [address] [place]  [address]  [email or web address]    PATIENT NAME: Alexandra Guaman  MEDICAL RECORD NUMBER:  [id number]  TODAY'S DATE: @ED@       Discharge Instructions - Post op D&C Hysteroscopy    Activity -   You may drive 48 hours after surgery   Stairs, lifting, exercise and showering is ok when you feel up to it    Vaginal -   Tub baths, tampons, douching, and intercourse is okay after 2 weeks    Bleeding -   Mild clotting is to be expected   Call the office if you are changing more than 3 pads per hour    Diet - Regular diet    Bowels - You may use Milk of Magnesia, Colace, Miralax, glycerin suppositories or a Fleets enema     Pain Medication - Use prescriptions as ordered, Tylenol or Advil is OK    Call the office:  If your temperature is above 101 degrees  If you have any swelling, redness, or tenderness of your lower legs  Foul smelling vaginal odor or discharge  Call the office to make a 2 week post-op appointment

## 2024-11-12 NOTE — PROGRESS NOTES
Assisted to and from BR to void.  Moderate amount bloody vaginal drainage noted.  Up in chair.   at bedside.

## (undated) DEVICE — MINOR LITHOTOMY: Brand: MEDLINE INDUSTRIES, INC.

## (undated) DEVICE — TRANSFER SET 3": Brand: MEDLINE INDUSTRIES, INC.

## (undated) DEVICE — CYSTO/BLADDER IRRIGATION SET, REGULATING CLAMP

## (undated) DEVICE — SOLUTION IV 1000ML 0.9% SOD CHL PH 5 INJ USP VIAFLX PLAS

## (undated) DEVICE — FLUID MGMT SYS FLUENT KIT 6/PK

## (undated) DEVICE — MERCY HEALTH WEST TURNOVER: Brand: MEDLINE INDUSTRIES, INC.

## (undated) DEVICE — CATHETER URETH 16FR L16IN RED RUB INTMIT ROB MOD BARDX

## (undated) DEVICE — DEVICE TISSUE REMOVAL XL FLUID MANAGEMENT MYOSURE

## (undated) DEVICE — ELECTRODE PT RET AD L9FT HI MOIST COND ADH HYDRGEL CORDED

## (undated) DEVICE — DRAPE LAP 104X35X124IN BLU CTRL + FAB REINF ABD FEN